# Patient Record
Sex: FEMALE | Race: BLACK OR AFRICAN AMERICAN | NOT HISPANIC OR LATINO | URBAN - METROPOLITAN AREA
[De-identification: names, ages, dates, MRNs, and addresses within clinical notes are randomized per-mention and may not be internally consistent; named-entity substitution may affect disease eponyms.]

---

## 2023-02-09 ENCOUNTER — OFFICE VISIT (OUTPATIENT)
Dept: URGENT CARE | Facility: CLINIC | Age: 78
End: 2023-02-09
Payer: COMMERCIAL

## 2023-02-09 VITALS
OXYGEN SATURATION: 100 % | HEART RATE: 92 BPM | TEMPERATURE: 99 F | RESPIRATION RATE: 18 BRPM | SYSTOLIC BLOOD PRESSURE: 132 MMHG | DIASTOLIC BLOOD PRESSURE: 60 MMHG

## 2023-02-09 DIAGNOSIS — R09.82 POST-NASAL DRIP: ICD-10-CM

## 2023-02-09 DIAGNOSIS — H66.002 NON-RECURRENT ACUTE SUPPURATIVE OTITIS MEDIA OF LEFT EAR WITHOUT SPONTANEOUS RUPTURE OF TYMPANIC MEMBRANE: Primary | ICD-10-CM

## 2023-02-09 DIAGNOSIS — H92.02 LEFT EAR PAIN: ICD-10-CM

## 2023-02-09 PROCEDURE — 3078F PR MOST RECENT DIASTOLIC BLOOD PRESSURE < 80 MM HG: ICD-10-PCS | Mod: CPTII,S$GLB,, | Performed by: NURSE PRACTITIONER

## 2023-02-09 PROCEDURE — 1160F PR REVIEW ALL MEDS BY PRESCRIBER/CLIN PHARMACIST DOCUMENTED: ICD-10-PCS | Mod: CPTII,S$GLB,, | Performed by: NURSE PRACTITIONER

## 2023-02-09 PROCEDURE — 1159F MED LIST DOCD IN RCRD: CPT | Mod: CPTII,S$GLB,, | Performed by: NURSE PRACTITIONER

## 2023-02-09 PROCEDURE — 99203 OFFICE O/P NEW LOW 30 MIN: CPT | Mod: S$GLB,,, | Performed by: NURSE PRACTITIONER

## 2023-02-09 PROCEDURE — 1159F PR MEDICATION LIST DOCUMENTED IN MEDICAL RECORD: ICD-10-PCS | Mod: CPTII,S$GLB,, | Performed by: NURSE PRACTITIONER

## 2023-02-09 PROCEDURE — 1125F PR PAIN SEVERITY QUANTIFIED, PAIN PRESENT: ICD-10-PCS | Mod: CPTII,S$GLB,, | Performed by: NURSE PRACTITIONER

## 2023-02-09 PROCEDURE — 3075F SYST BP GE 130 - 139MM HG: CPT | Mod: CPTII,S$GLB,, | Performed by: NURSE PRACTITIONER

## 2023-02-09 PROCEDURE — 3078F DIAST BP <80 MM HG: CPT | Mod: CPTII,S$GLB,, | Performed by: NURSE PRACTITIONER

## 2023-02-09 PROCEDURE — 1125F AMNT PAIN NOTED PAIN PRSNT: CPT | Mod: CPTII,S$GLB,, | Performed by: NURSE PRACTITIONER

## 2023-02-09 PROCEDURE — 1160F RVW MEDS BY RX/DR IN RCRD: CPT | Mod: CPTII,S$GLB,, | Performed by: NURSE PRACTITIONER

## 2023-02-09 PROCEDURE — 99203 PR OFFICE/OUTPT VISIT, NEW, LEVL III, 30-44 MIN: ICD-10-PCS | Mod: S$GLB,,, | Performed by: NURSE PRACTITIONER

## 2023-02-09 PROCEDURE — 3075F PR MOST RECENT SYSTOLIC BLOOD PRESS GE 130-139MM HG: ICD-10-PCS | Mod: CPTII,S$GLB,, | Performed by: NURSE PRACTITIONER

## 2023-02-09 RX ORDER — ASPIRIN 81 MG/1
1 TABLET ORAL EVERY MORNING
COMMUNITY

## 2023-02-09 RX ORDER — DOXYCYCLINE 100 MG/1
100 CAPSULE ORAL
COMMUNITY
Start: 2023-02-08 | End: 2023-02-15

## 2023-02-09 RX ORDER — BLOOD-GLUCOSE METER
EACH MISCELLANEOUS
COMMUNITY
Start: 2022-09-07

## 2023-02-09 RX ORDER — MIRTAZAPINE 30 MG/1
TABLET, FILM COATED ORAL
COMMUNITY
Start: 2023-01-25

## 2023-02-09 RX ORDER — METFORMIN HYDROCHLORIDE 500 MG/1
TABLET, EXTENDED RELEASE ORAL
COMMUNITY
Start: 2023-01-25

## 2023-02-09 RX ORDER — ATORVASTATIN CALCIUM 40 MG/1
TABLET, FILM COATED ORAL
COMMUNITY
Start: 2023-02-06

## 2023-02-09 RX ORDER — CLONAZEPAM 0.5 MG/1
TABLET ORAL
COMMUNITY
Start: 2023-01-25

## 2023-02-09 RX ORDER — LOSARTAN POTASSIUM 100 MG/1
TABLET ORAL
COMMUNITY
Start: 2023-01-01

## 2023-02-09 RX ORDER — CALCIUM CITRATE/VITAMIN D3 200MG-6.25
TABLET ORAL
COMMUNITY
Start: 2022-09-29

## 2023-02-09 RX ORDER — INSULIN PUMP SYRINGE, 3 ML
EACH MISCELLANEOUS
COMMUNITY
Start: 2023-02-06

## 2023-02-09 RX ORDER — FERROUS GLUCONATE 324(38)MG
324 TABLET ORAL
COMMUNITY
Start: 2022-10-12

## 2023-02-09 RX ORDER — LANCETS 33 GAUGE
EACH MISCELLANEOUS
COMMUNITY
Start: 2022-09-29

## 2023-02-09 RX ORDER — EMPAGLIFLOZIN 10 MG/1
TABLET, FILM COATED ORAL
COMMUNITY
Start: 2022-09-27

## 2023-02-09 RX ORDER — CETIRIZINE HYDROCHLORIDE 10 MG/1
1 TABLET ORAL DAILY
COMMUNITY
Start: 2022-10-12

## 2023-02-09 RX ORDER — AMLODIPINE BESYLATE 10 MG/1
TABLET ORAL
COMMUNITY
Start: 2023-02-06

## 2023-02-09 RX ORDER — FLUTICASONE PROPIONATE 50 MCG
2 SPRAY, SUSPENSION (ML) NASAL
COMMUNITY
Start: 2022-10-12

## 2023-02-09 RX ORDER — AZITHROMYCIN 250 MG/1
TABLET, FILM COATED ORAL
Qty: 6 TABLET | Refills: 0 | Status: SHIPPED | OUTPATIENT
Start: 2023-02-09 | End: 2023-02-14

## 2023-02-09 NOTE — PATIENT INSTRUCTIONS
Tylenol OTC as directed for pain  Zyrtec or Claritin OTC as directed  Continue Flonase and Azelastine daily as directed   Complete antibiotic course as directed  Follow up as needed

## 2023-02-09 NOTE — PROGRESS NOTES
Subjective:       Patient ID: Ania Finley is a 77 y.o. female.    Vitals:  tympanic temperature is 98.8 °F (37.1 °C). Her blood pressure is 132/60 and her pulse is 92. Her respiration is 18 and oxygen saturation is 100%.     Chief Complaint: Otalgia    77 year old female presents for evaluation of left ear pain x 2 weeks. Pain occurs mainly at night. Pain scale rating is between 8-10/10. Additional complaints of dry cough and post nasal drip x 1 week. Flonase and azelastine     Otalgia   There is pain in the left ear. This is a new problem. The current episode started 1 to 4 weeks ago. The problem occurs constantly. The problem has been gradually worsening. There has been no fever. The pain is at a severity of 7/10. The pain is moderate. Associated symptoms include coughing. Pertinent negatives include no abdominal pain, diarrhea, ear discharge, headaches, hearing loss, neck pain, rash, rhinorrhea, sore throat or vomiting. She has tried acetaminophen for the symptoms. The treatment provided no relief.     Constitution: Negative for activity change, appetite change, chills, sweating, fatigue and fever.   HENT:  Positive for ear pain and postnasal drip. Negative for ear discharge, hearing loss, sinus pain, sinus pressure and sore throat.    Neck: neck negative. Negative for neck pain.   Cardiovascular: Negative.    Eyes: Negative.    Respiratory:  Positive for cough. Negative for chest tightness, sputum production, bloody sputum, shortness of breath and wheezing.    Gastrointestinal: Negative.  Negative for abdominal pain, vomiting and diarrhea.   Endocrine: negative.   Genitourinary: Negative.    Musculoskeletal: Negative.    Skin: Negative.  Negative for rash.   Allergic/Immunologic: Negative for sneezing.   Neurological: Negative.  Negative for headaches.   Hematologic/Lymphatic: Negative.    Psychiatric/Behavioral: Negative.       Objective:      Physical Exam   Constitutional: She is oriented to  person, place, and time. She is cooperative.  Non-toxic appearance. She does not appear ill. No distress. awake  HENT:   Head: Normocephalic and atraumatic.   Ears:   Right Ear: No cerumen not present. Tympanic membrane is not injected, not scarred, not perforated, not erythematous, not retracted and not bulging. No middle ear effusion. impacted cerumen  Left Ear: No cerumen not present. Tympanic membrane is erythematous. Tympanic membrane is not injected, not scarred, not perforated, not retracted and not bulging. A middle ear effusion is present. impacted cerumen  Nose: Nose normal. No rhinorrhea or purulent discharge. Right sinus exhibits no maxillary sinus tenderness and no frontal sinus tenderness. Left sinus exhibits no maxillary sinus tenderness and no frontal sinus tenderness.   Mouth/Throat: Mucous membranes are normal. Mucous membranes are moist. Posterior oropharyngeal erythema and cobblestoning present. No oropharyngeal exudate, posterior oropharyngeal edema or tonsillar abscesses. Tonsils are 0 on the right. Tonsils are 0 on the left. No tonsillar exudate.   Eyes: Conjunctivae are normal. Pupils are equal, round, and reactive to light. Right eye exhibits no discharge. Left eye exhibits no discharge. No scleral icterus. Extraocular movement intact   Neck: Neck supple.   Cardiovascular: Normal rate and regular rhythm.   Murmur heard.  Pulmonary/Chest: Effort normal and breath sounds normal. No stridor. No respiratory distress. She has no decreased breath sounds. She has no wheezes. She has no rhonchi. She has no rales. She exhibits no tenderness.   Abdominal: Normal appearance.   Musculoskeletal: Normal range of motion.         General: Normal range of motion.   Neurological: no focal deficit. She is alert and oriented to person, place, and time.   Skin: Skin is warm, dry and not diaphoretic.   Psychiatric: Her behavior is normal. Mood, judgment and thought content normal.   Nursing note and vitals  reviewed.      Assessment:       1. Non-recurrent acute suppurative otitis media of left ear without spontaneous rupture of tympanic membrane    2. Post-nasal drip    3. Left ear pain          Plan:       Patient presents with symptoms that are consistent with otitis media. Plan is to treat infection, manage symptoms, and prevent worsening.      Non-recurrent acute suppurative otitis media of left ear without spontaneous rupture of tympanic membrane  -     azithromycin (Z-GUILLERMINA) 250 MG tablet; Take 2 tablets by mouth on day 1; Take 1 tablet by mouth on days 2-5  Dispense: 6 tablet; Refill: 0    Post-nasal drip    Left ear pain                 Patient Instructions   Tylenol OTC as directed for pain  Zyrtec or Claritin OTC as directed  Continue Flonase and Azelastine daily as directed   Complete antibiotic course as directed  Follow up as needed

## 2024-11-02 ENCOUNTER — OFFICE VISIT (OUTPATIENT)
Dept: URGENT CARE | Facility: CLINIC | Age: 79
End: 2024-11-02
Payer: COMMERCIAL

## 2024-11-02 VITALS
BODY MASS INDEX: 21.83 KG/M2 | RESPIRATION RATE: 16 BRPM | TEMPERATURE: 98 F | WEIGHT: 118.63 LBS | SYSTOLIC BLOOD PRESSURE: 148 MMHG | HEART RATE: 76 BPM | HEIGHT: 62 IN | OXYGEN SATURATION: 100 % | DIASTOLIC BLOOD PRESSURE: 68 MMHG

## 2024-11-02 DIAGNOSIS — I10 ELEVATED BLOOD PRESSURE READING IN OFFICE WITH DIAGNOSIS OF HYPERTENSION: ICD-10-CM

## 2024-11-02 DIAGNOSIS — Z98.890 HISTORY OF CARPAL TUNNEL SURGERY: ICD-10-CM

## 2024-11-02 DIAGNOSIS — M79.642 BILATERAL HAND PAIN: Primary | ICD-10-CM

## 2024-11-02 DIAGNOSIS — M79.641 BILATERAL HAND PAIN: Primary | ICD-10-CM

## 2024-11-02 PROBLEM — I35.0 NONRHEUMATIC AORTIC VALVE STENOSIS: Status: ACTIVE | Noted: 2021-05-24

## 2024-11-02 PROBLEM — E78.5 HYPERLIPIDEMIA ASSOCIATED WITH TYPE 2 DIABETES MELLITUS: Status: ACTIVE | Noted: 2021-04-28

## 2024-11-02 PROBLEM — I25.10 CORONARY ARTERY DISEASE: Status: ACTIVE | Noted: 2021-04-28

## 2024-11-02 PROBLEM — R29.898 WEAKNESS OF BOTH HANDS: Status: ACTIVE | Noted: 2023-12-26

## 2024-11-02 PROBLEM — R20.2 PARESTHESIA OF BOTH HANDS: Status: ACTIVE | Noted: 2023-12-26

## 2024-11-02 PROBLEM — E11.69 HYPERLIPIDEMIA ASSOCIATED WITH TYPE 2 DIABETES MELLITUS: Status: ACTIVE | Noted: 2021-04-28

## 2024-11-02 PROBLEM — F33.9 MAJOR DEPRESSION, RECURRENT, CHRONIC: Status: ACTIVE | Noted: 2018-05-31

## 2024-11-02 PROBLEM — E11.59 HYPERTENSION ASSOCIATED WITH DIABETES: Status: ACTIVE | Noted: 2024-11-02

## 2024-11-02 PROBLEM — I15.2 HYPERTENSION ASSOCIATED WITH DIABETES: Status: ACTIVE | Noted: 2024-11-02

## 2024-11-02 PROCEDURE — 99214 OFFICE O/P EST MOD 30 MIN: CPT | Mod: S$GLB,,, | Performed by: NURSE PRACTITIONER

## 2024-11-02 RX ORDER — DEXTROMETHORPHAN HYDROBROMIDE, GUAIFENESIN 5; 100 MG/5ML; MG/5ML
650 LIQUID ORAL EVERY 8 HOURS
Qty: 24 TABLET | Refills: 0 | Status: SHIPPED | OUTPATIENT
Start: 2024-11-02 | End: 2024-11-10

## 2024-11-02 RX ORDER — MELOXICAM 7.5 MG/1
7.5 TABLET ORAL DAILY
COMMUNITY

## 2024-11-04 ENCOUNTER — TELEPHONE (OUTPATIENT)
Dept: URGENT CARE | Facility: CLINIC | Age: 79
End: 2024-11-04
Payer: COMMERCIAL

## 2024-11-04 NOTE — TELEPHONE ENCOUNTER
----- Message from Med Assistant Perez sent at 11/4/2024  9:09 AM CST -----  Contact: [phone]  Pt called                In regards to needing a call back from staff to see if a drivers license was found at location. Pt was seen on 11/02/24.        Spoke to pt and informed her no license was found at the clinic

## 2025-03-26 ENCOUNTER — OFFICE VISIT (OUTPATIENT)
Dept: ORTHOPEDICS | Facility: CLINIC | Age: 80
End: 2025-03-26
Payer: MEDICARE

## 2025-03-26 DIAGNOSIS — R20.2 PARESTHESIA OF BOTH HANDS: ICD-10-CM

## 2025-03-26 DIAGNOSIS — M54.12 CERVICAL RADICULOPATHY: Primary | ICD-10-CM

## 2025-03-26 PROCEDURE — 1159F MED LIST DOCD IN RCRD: CPT | Mod: CPTII,S$GLB,, | Performed by: ORTHOPAEDIC SURGERY

## 2025-03-26 PROCEDURE — 3288F FALL RISK ASSESSMENT DOCD: CPT | Mod: CPTII,S$GLB,, | Performed by: ORTHOPAEDIC SURGERY

## 2025-03-26 PROCEDURE — 1125F AMNT PAIN NOTED PAIN PRSNT: CPT | Mod: CPTII,S$GLB,, | Performed by: ORTHOPAEDIC SURGERY

## 2025-03-26 PROCEDURE — 99213 OFFICE O/P EST LOW 20 MIN: CPT | Mod: S$GLB,,, | Performed by: ORTHOPAEDIC SURGERY

## 2025-03-26 PROCEDURE — 99999 PR PBB SHADOW E&M-EST. PATIENT-LVL III: CPT | Mod: PBBFAC,,, | Performed by: ORTHOPAEDIC SURGERY

## 2025-03-26 PROCEDURE — 1101F PT FALLS ASSESS-DOCD LE1/YR: CPT | Mod: CPTII,S$GLB,, | Performed by: ORTHOPAEDIC SURGERY

## 2025-03-26 NOTE — ASSESSMENT & PLAN NOTE
The patient and I talked at length about the natural history and pathophysiology of bilateral hand numbness with the differential diagnosis of cervical stenosis, she understands that this is a chronic problem which may have acute episodic exacerbations.   Symptoms may resolve, worsen and even become permanent.  She would like a spine referral we will do this for her I am not sure that I have anything personally to offer for her hands she does not have any provocative maneuvers regarding her carpal tunnel.

## 2025-03-26 NOTE — PROGRESS NOTES
SHARI Arriola M.D.  Orthopaedic Hand and Wrist Surgery  [facility]    Patient ID: Ania Finley  YOB: 1945  MRN: [id number]    Provider Note/Medical Decision Makin. Cervical radiculopathy  -     Ambulatory referral/consult to Spine Care; Future; Expected date: 2025    2. Paresthesia of both hands  Assessment & Plan:  The patient and I talked at length about the natural history and pathophysiology of bilateral hand numbness with the differential diagnosis of cervical stenosis, she understands that this is a chronic problem which may have acute episodic exacerbations.   Symptoms may resolve, worsen and even become permanent.  She would like a spine referral we will do this for her I am not sure that I have anything personally to offer for her hands she does not have any provocative maneuvers regarding her carpal tunnel.             Chief Complaint: Pain of the Right Hand and Pain of the Left Hand      Referred By: Self,Aaareferral    History of Present Illness: Ania Finley is a 79 y.o. female presents today for bilateral carpal tunnel syndrome. She still has numbness in her hands and fingers at this time. She states that at one of her recent visit to the [facility] clinic that she was advised that she should be seen by a spine providers due to the possibility of the symptoms coming from her cervical spine over the wrist. Due to the numbness and tingling she feels in the hands she is having trouble with activities of daily life.      We did perform bilateral carpal tunnel release which helped with her burning tingling pain but her constant numbness is still there it is difficult to tell if this numbness is secondary to her diabetes versus some spine related issue    Patient was queried and this is the extent of the patients current complaints today.    Past Medical History:     Estimated body mass index is 21.69 kg/m² as calculated from  "the following:    Height as of 11/2/24: 5' 2" (1.575 m).    Weight as of 11/2/24: 53.8 kg (118 lb 9.7 oz).  Past Medical History:   Diagnosis Date    Diabetes mellitus, type 2     Hypertension      No past surgical history on file.  Family History   Problem Relation Name Age of Onset    Heart disease Mother      No Known Problems Father       Social History[1]  Medication List with Changes/Refills   Current Medications    AMLODIPINE (NORVASC) 10 MG TABLET    Take by mouth.    ASPIRIN (ECOTRIN) 81 MG EC TABLET    Take 1 tablet by mouth every morning.    ATORVASTATIN (LIPITOR) 40 MG TABLET    Take by mouth.    BLOOD-GLUCOSE METER KIT    USE AS DIRECTED TO CHECK BLOOD SUGAR    CETIRIZINE (ZYRTEC) 10 MG TABLET    Take 1 tablet by mouth once daily.    CLONAZEPAM (KLONOPIN) 0.5 MG TABLET    Take by mouth.    FERROUS GLUCONATE (FERGON) 324 MG TABLET    Take 324 mg by mouth.    FLUTICASONE PROPIONATE (FLONASE) 50 MCG/ACTUATION NASAL SPRAY    2 sprays by Nasal route.    JARDIANCE 10 MG TABLET    Take by mouth.    LOSARTAN (COZAAR) 100 MG TABLET    Take by mouth.    MELOXICAM (MOBIC) 7.5 MG TABLET    Take 7.5 mg by mouth once daily.    METFORMIN (GLUCOPHAGE-XR) 500 MG ER 24HR TABLET    Take by mouth.    MIRTAZAPINE (REMERON) 30 MG TABLET    Take by mouth.    TRUE METRIX GLUCOSE TEST STRIP STRP    SMARTSIG:Via Meter    TRUE METRIX LEVEL 1 SOLN        TRUEPLUS LANCETS 33 GAUGE MISC    Apply topically.     Review of patient's allergies indicates:   Allergen Reactions    Cephalexin Itching    Hydrocodone-acetaminophen Itching    Ibuprofen      ROS    Physical Exam:   GENERAL: Well appearing, appropriate for stated age, no acute distress.  CARDIOVASCULAR:  Fingers have good brisk refill and good turgor.   PULMONARY: Respirations are even and non-labored.  NEURO: Awake, alert, and oriented x 3.  PSYCH: Mood & affect are appropriate.  Ortho/SPM Exam  Hand/Wrist Musculoskeletal Exam  Negative Tinel's over both carpal " tunnels  Negative Phalen's bilaterally  Baseline decreased sensation over the right middle finger ring finger and the entire left-hand  Negative elbow flexion test bilaterally  Full range of motion both hands  5/5 abductor pollicis brevis 1st dorsal osseous, wrist extension, wrist flexion, , elbow flexion, elbow extension bilaterally        Provider Note/Medical Decision Makin. Cervical radiculopathy  -     Ambulatory referral/consult to Spine Care; Future; Expected date: 2025    2. Paresthesia of both hands  Assessment & Plan:  The patient and I talked at length about the natural history and pathophysiology of bilateral hand numbness with the differential diagnosis of cervical stenosis, she understands that this is a chronic problem which may have acute episodic exacerbations.   Symptoms may resolve, worsen and even become permanent.  She would like a spine referral we will do this for her I am not sure that I have anything personally to offer for her hands she does not have any provocative maneuvers regarding her carpal tunnel.             I discussed worrisome and red flag signs and symptoms with the patient. The patient expressed understanding and agreed to alert me immediately or to go to the emergency room if they experience any of these.   Treatment plan was developed with input from the patient/family, and they expressed understanding and agreement with the plan. All questions were answered today.    There are no Patient Instructions on file for this visit.    SHARI Arriola M.D.  [facility]  Orthopedic Hand and Wrist Surgeon    [facility]  Dr. Denis Arriola   [employer]   [employer]     Disclaimer: This note was prepared using a voice recognition system and is likely to have sound alike errors within the text.             [1]   Social History  Socioeconomic History    Marital status: Single    Number of children: 1   Tobacco Use     Smoking status: Never     Passive exposure: Never    Smokeless tobacco: Never   Substance and Sexual Activity    Alcohol use: Not Currently    Drug use: Yes    Sexual activity: Not Currently     Social Drivers of Health     Financial Resource Strain: Low Risk  (11/19/2024)    [facility]    Overall Financial Resource Strain (CARDIA)     Difficulty of Paying Living Expenses: Not hard at all   Food Insecurity: No Food Insecurity (11/19/2024)    [facility]    Hunger Vital Sign     Worried About Running Out of Food in the Last Year: Never true     Ran Out of Food in the Last Year: Never true   Transportation Needs: No Transportation Needs (11/19/2024)    [facility]    PRAPARE - Transportation     Lack of Transportation (Medical): No     Lack of Transportation (Non-Medical): No   Physical Activity: Inactive (11/19/2024)    [facility]    Exercise Vital Sign     Days of Exercise per Week: 0 days     Minutes of Exercise per Session: 0 min   Stress: No Stress Concern Present (11/19/2024)    Received from [facility]    [employer] - Occupational Stress Questionnaire     Feeling of Stress : Not at all   Housing Stability: Low Risk  (11/19/2024)    [facility]    Housing Stability Vital Sign     Unable to Pay for Housing in the Last Year: No     Number of Times Moved in the Last Year: 0     Homeless in the Last Year: No

## 2025-03-27 ENCOUNTER — TELEPHONE (OUTPATIENT)
Dept: PAIN MEDICINE | Facility: CLINIC | Age: 80
End: 2025-03-27
Payer: MEDICARE

## 2025-03-28 ENCOUNTER — TELEPHONE (OUTPATIENT)
Dept: PAIN MEDICINE | Facility: CLINIC | Age: 80
End: 2025-03-28
Payer: MEDICARE

## 2025-03-28 NOTE — TELEPHONE ENCOUNTER
----- Message from Lety sent at 3/28/2025  9:43 AM CDT -----  Contact: Ania  Type:  Patient Returning CallWho Called:Ania Who Left Message for Patient:nurse Does the patient know what this is regarding?:pt returning missed callWould the patient rather a call back or a response via [facility]? callBest Call Back Number:[phone] Information: Please call for additional information

## 2025-03-28 NOTE — TELEPHONE ENCOUNTER
Called patient and informed her that she have an appt on May 14/2025. Pt verbalized understanding.    Ken COLVIN

## 2025-04-22 ENCOUNTER — TELEPHONE (OUTPATIENT)
Dept: PAIN MEDICINE | Facility: CLINIC | Age: 80
End: 2025-04-22
Payer: MEDICARE

## 2025-04-22 NOTE — TELEPHONE ENCOUNTER
----- Message from Liliane sent at 4/22/2025 11:53 AM CDT -----  Contact: pt  Type:  Patient Returning CallWho Called: ptWho Left Message for Patient: nurseDoes the patient know what this is regarding?: Would the patient rather a call back or a response via [employer]? phoneBest Call Back Number: [phone] Information:

## 2025-04-22 NOTE — TELEPHONE ENCOUNTER
----- Message from Liliane sent at 4/22/2025 11:53 AM CDT -----  Contact: pt  Type:  Patient Returning CallWho Called: ptWho Left Message for Patient: nurseDoes the patient know what this is regarding?: Would the patient rather a call back or a response via [employer]? phoneBest Call Back Number: [phone] Information: